# Patient Record
Sex: MALE | Race: WHITE | Employment: FULL TIME | URBAN - METROPOLITAN AREA
[De-identification: names, ages, dates, MRNs, and addresses within clinical notes are randomized per-mention and may not be internally consistent; named-entity substitution may affect disease eponyms.]

---

## 2022-09-23 ENCOUNTER — HOSPITAL ENCOUNTER (EMERGENCY)
Age: 22
Discharge: HOME | End: 2022-09-23
Attending: EMERGENCY MEDICINE
Payer: OTHER GOVERNMENT

## 2022-09-23 VITALS
DIASTOLIC BLOOD PRESSURE: 89 MMHG | HEART RATE: 56 BPM | OXYGEN SATURATION: 99 % | RESPIRATION RATE: 18 BRPM | HEIGHT: 66 IN | BODY MASS INDEX: 37.77 KG/M2 | WEIGHT: 235 LBS | SYSTOLIC BLOOD PRESSURE: 144 MMHG | TEMPERATURE: 98.2 F

## 2022-09-23 DIAGNOSIS — N48.1 BALANITIS: Primary | ICD-10-CM

## 2022-09-23 LAB
APPEARANCE UR: CLEAR
BACTERIA URNS QL MICRO: NEGATIVE /HPF
BILIRUB UR QL: NEGATIVE
COLOR UR: YELLOW
EPITH CASTS URNS QL MICRO: NEGATIVE /LPF (ref 0–5)
GLUCOSE UR STRIP.AUTO-MCNC: NEGATIVE MG/DL
HGB UR QL STRIP: NEGATIVE
KETONES UR QL STRIP.AUTO: NEGATIVE MG/DL
LEUKOCYTE ESTERASE UR QL STRIP.AUTO: NEGATIVE
NITRITE UR QL STRIP.AUTO: NEGATIVE
OTHER,OTHU: NORMAL
PH UR STRIP: 7 [PH] (ref 5–8)
PROT UR STRIP-MCNC: ABNORMAL MG/DL
RBC #/AREA URNS HPF: NEGATIVE /HPF (ref 0–5)
SP GR UR REFRACTOMETRY: 1.02 (ref 1–1.03)
UROBILINOGEN UR QL STRIP.AUTO: 1 EU/DL (ref 0.2–1)
WBC URNS QL MICRO: NORMAL /HPF (ref 0–5)

## 2022-09-23 PROCEDURE — 81001 URINALYSIS AUTO W/SCOPE: CPT

## 2022-09-23 PROCEDURE — 99283 EMERGENCY DEPT VISIT LOW MDM: CPT

## 2022-09-23 PROCEDURE — 87661 TRICHOMONAS VAGINALIS AMPLIF: CPT

## 2022-09-23 PROCEDURE — 87491 CHLMYD TRACH DNA AMP PROBE: CPT

## 2022-09-23 RX ORDER — CLOTRIMAZOLE AND BETAMETHASONE DIPROPIONATE 10; .64 MG/G; MG/G
CREAM TOPICAL 2 TIMES DAILY
Qty: 15 G | Refills: 0 | Status: SHIPPED | OUTPATIENT
Start: 2022-09-23 | End: 2022-09-23 | Stop reason: SDUPTHER

## 2022-09-23 RX ORDER — CLOTRIMAZOLE AND BETAMETHASONE DIPROPIONATE 10; .64 MG/G; MG/G
CREAM TOPICAL 2 TIMES DAILY
Qty: 15 G | Refills: 0 | Status: SHIPPED | OUTPATIENT
Start: 2022-09-23

## 2022-09-23 NOTE — ED PROVIDER NOTES
EMERGENCY DEPARTMENT HISTORY AND PHYSICAL EXAM    Date: 9/23/2022  Patient Name: Karla Christianson    History of Presenting Illness     Chief Complaint   Patient presents with    Penis Pain         History Provided By: Patient    Chief Complaint: penis pain    HPI(Context):   11:49 AM  Karla Christianson is a 25 y.o. male with no PMH who presents to the emergency department C/O irritation to head of penis. Associated sxs include redness and stinging with intercourse. Pt notes sxs x 2 days. Redness to prepuce of penis. Pt is here with SO and both deny other sexual partners or concern for STIs. Pt is circumcised. . Pt denies dysuria, urethral discharge, penile lesions, testicular pain/swelling, hx of DMII and any other sxs or complaints. PCP: None        Past History     Past Medical History:  No past medical history on file. Past Surgical History:  No past surgical history on file. Family History:  No family history on file. Social History: Allergies:  No Known Allergies      Review of Systems   Review of Systems   Gastrointestinal:  Negative for abdominal pain. Genitourinary:  Negative for dysuria, genital sores, penile discharge, penile pain and scrotal swelling. Musculoskeletal:  Negative for back pain. Skin:  Positive for rash. All other systems reviewed and are negative. Physical Exam     Vitals:    09/23/22 1027   BP: (!) 144/89   Pulse: (!) 56   Resp: 18   Temp: 98.2 °F (36.8 °C)   SpO2: 99%   Weight: 106.6 kg (235 lb)   Height: 5' 6\" (1.676 m)     Physical Exam  Vitals and nursing note reviewed. Constitutional:       General: He is not in acute distress. Appearance: He is well-developed. He is not diaphoretic. Comments:  male ped in NAD. Alert   HENT:      Head: Normocephalic and atraumatic. Right Ear: External ear normal.      Left Ear: External ear normal.      Nose: Nose normal.   Eyes:      General: No scleral icterus. Right eye: No discharge. Left eye: No discharge. Conjunctiva/sclera: Conjunctivae normal.   Cardiovascular:      Rate and Rhythm: Normal rate and regular rhythm. Heart sounds: Normal heart sounds. No murmur heard. No friction rub. No gallop. Pulmonary:      Effort: Pulmonary effort is normal. No tachypnea, accessory muscle usage or respiratory distress. Breath sounds: Normal breath sounds. No decreased breath sounds, wheezing, rhonchi or rales. Abdominal:      General: There is no distension. Palpations: Abdomen is soft. Tenderness: There is no abdominal tenderness. Genitourinary:     Pubic Area: No rash. Penis: Circumcised. Erythema, tenderness (minimal) and swelling (mild) present. No discharge or lesions. Testes:         Right: Tenderness not present. Left: Tenderness not present. Epididymis:      Right: Normal.      Left: Normal.      Comments: Erythematous scaly rash to prepuce of penis. No vesicular lesions. No urethral drainage  Musculoskeletal:         General: Normal range of motion. Cervical back: Normal range of motion. Lymphadenopathy:      Lower Body: No right inguinal adenopathy. No left inguinal adenopathy. Skin:     General: Skin is warm and dry. Neurological:      Mental Status: He is alert and oriented to person, place, and time. Psychiatric:         Judgment: Judgment normal.           Diagnostic Study Results     Labs -     No results found for this or any previous visit (from the past 12 hour(s)). No orders to display     CT Results  (Last 48 hours)      None          CXR Results  (Last 48 hours)      None            Medications given in the ED-  Medications - No data to display      Medical Decision Making   I am the first provider for this patient. I reviewed the vital signs, available nursing notes, past medical history, past surgical history, family history and social history. Vital Signs-Reviewed the patient's vital signs.     Pulse Oximetry Analysis - 99% on RA. NORMAL    Records Reviewed: Nursing Notes    Provider Notes (Medical Decision Making): balanitis, eczema, dermatitis, tinea. Doubt syphilitic. Not c/w HSV. No chancroid. Procedures:  Procedures    ED Course:   11:49 AM Initial assessment performed. The patients presenting problems have been discussed, and they are in agreement with the care plan formulated and outlined with them. I have encouraged them to ask questions as they arise throughout their visit. Diagnosis and Disposition       Will tx for balanitis. Discussed clean and dry. Expose to light as much as possible  FU. Reasons to RTED discussed with pt. All questions answered. Pt feels comfortable going home at this time. Pt expressed understanding and he agrees with plan. 1. Balanitis        PLAN:  1. D/C Home  2. Discharge Medication List as of 9/23/2022 12:28 PM        3. Follow-up Information       Follow up With Specialties Details Why Contact Info    FABIANA    [phone]    [facility] EMERGENCY DEPT Emergency Medicine   [address]  [phone]          _______________________________    Attestations: This note is prepared by Nelsy Ojeda PA-C.  _______________________________      Please note that this dictation was completed with [employer], the computer voice recognition software. Quite often unanticipated grammatical, syntax, homophones, and other interpretive errors are inadvertently transcribed by the computer software. Please disregard these errors. Please excuse any errors that have escaped final proofreading.

## 2022-09-23 NOTE — ED TRIAGE NOTES
Pt report report penile swelling to the neck of glans since this AM. Pt is circumcised, per pt. Pt noted rash on Penis X 2 days. Denies penile discharge.

## 2022-09-26 LAB
C TRACH RRNA SPEC QL NAA+PROBE: NEGATIVE
N GONORRHOEA RRNA SPEC QL NAA+PROBE: NEGATIVE
SPECIMEN SOURCE: NORMAL

## 2022-09-28 LAB
SPECIMEN SOURCE: NORMAL
T VAGINALIS RRNA SPEC QL NAA+PROBE: NEGATIVE